# Patient Record
Sex: FEMALE | Race: WHITE | Employment: UNEMPLOYED | URBAN - METROPOLITAN AREA
[De-identification: names, ages, dates, MRNs, and addresses within clinical notes are randomized per-mention and may not be internally consistent; named-entity substitution may affect disease eponyms.]

---

## 2017-01-13 ENCOUNTER — OFFICE VISIT (OUTPATIENT)
Dept: ORTHOPEDIC SURGERY | Age: 57
End: 2017-01-13

## 2017-01-13 VITALS — BODY MASS INDEX: 22.63 KG/M2 | WEIGHT: 123 LBS | RESPIRATION RATE: 15 BRPM | HEIGHT: 62 IN

## 2017-01-13 DIAGNOSIS — S52.101A CLOSED FRACTURE OF PROXIMAL END OF RIGHT RADIUS, UNSPECIFIED FRACTURE MORPHOLOGY, INITIAL ENCOUNTER: Primary | ICD-10-CM

## 2017-01-13 PROCEDURE — 73110 X-RAY EXAM OF WRIST: CPT | Performed by: ORTHOPAEDIC SURGERY

## 2017-01-13 PROCEDURE — G8420 CALC BMI NORM PARAMETERS: HCPCS | Performed by: ORTHOPAEDIC SURGERY

## 2017-01-13 PROCEDURE — 99213 OFFICE O/P EST LOW 20 MIN: CPT | Performed by: ORTHOPAEDIC SURGERY

## 2017-01-13 PROCEDURE — 3014F SCREEN MAMMO DOC REV: CPT | Performed by: ORTHOPAEDIC SURGERY

## 2017-01-13 PROCEDURE — G8484 FLU IMMUNIZE NO ADMIN: HCPCS | Performed by: ORTHOPAEDIC SURGERY

## 2017-01-13 PROCEDURE — 3017F COLORECTAL CA SCREEN DOC REV: CPT | Performed by: ORTHOPAEDIC SURGERY

## 2017-01-13 PROCEDURE — 1036F TOBACCO NON-USER: CPT | Performed by: ORTHOPAEDIC SURGERY

## 2017-01-13 PROCEDURE — G8427 DOCREV CUR MEDS BY ELIG CLIN: HCPCS | Performed by: ORTHOPAEDIC SURGERY

## 2017-01-25 ENCOUNTER — TELEPHONE (OUTPATIENT)
Dept: ORTHOPEDIC SURGERY | Age: 57
End: 2017-01-25

## 2022-06-28 ENCOUNTER — OFFICE VISIT (OUTPATIENT)
Dept: UROGYNECOLOGY | Age: 62
End: 2022-06-28
Payer: MEDICARE

## 2022-06-28 DIAGNOSIS — N39.41 URGE INCONTINENCE: ICD-10-CM

## 2022-06-28 DIAGNOSIS — R10.2 PELVIC PAIN: Primary | ICD-10-CM

## 2022-06-28 DIAGNOSIS — R35.0 URINARY FREQUENCY: ICD-10-CM

## 2022-06-28 PROCEDURE — 99204 OFFICE O/P NEW MOD 45 MIN: CPT | Performed by: OBSTETRICS & GYNECOLOGY

## 2022-06-28 RX ORDER — HYDROCODONE BITARTRATE AND ACETAMINOPHEN 10; 325 MG/1; MG/1
1 TABLET ORAL EVERY 8 HOURS PRN
COMMUNITY

## 2022-06-28 RX ORDER — ESTRADIOL 0.1 MG/G
1 CREAM VAGINAL
COMMUNITY
Start: 2022-03-23

## 2022-06-28 RX ORDER — ESTRADIOL 0.5 MG/1
0.5 TABLET ORAL DAILY
COMMUNITY
Start: 2022-06-08

## 2022-06-28 RX ORDER — FLUOXETINE HYDROCHLORIDE 20 MG/1
20 CAPSULE ORAL
COMMUNITY

## 2022-06-28 RX ORDER — ALPRAZOLAM 2 MG/1
TABLET ORAL
COMMUNITY
Start: 2022-06-01

## 2022-06-28 ASSESSMENT — ENCOUNTER SYMPTOMS
BACK PAIN: 1
EYE ITCHING: 1
EYE PAIN: 1

## 2022-06-28 NOTE — PROGRESS NOTES
6/28/2022      HPI:     Name: Bharathi Smith  YOB: 1960    CC: Patient is a 62 y.o. female who is seen in consultation from Riley Rivera MD   for evaluation of stress incontinence , urge incontinence , pelvic pain and urinary frequency. HPI:      Op Note Irena Tipton MD - 03/08/2021 11:44 AM EST  Formatting of this note might be different from the original.  Patient: Bharathi Smith    Date of Procedure: 3/8/2021    CRN: [id number]    Preop dx: Anatomic stress urinary incontinence due to hypermobile urethra    Postop dx:   Same    Co-morbidities:  None    Operation:   Mid urethral sling using trans obturator approach with cystoscopy    Anesthesia: General    Surgeon: Dr. Sujit Daniel    Procedure: Urodynamic studies were performed in the standard fashion as demonstrated by the following procedure codes: [address], [address], [address] [place] [address], [facility].  Jd Otero RN    Uroflow:  Voided volume 34 ml   Residual volume 0 ml   Maximum flow (QMax) 6 ml/sec   Average flow rate (QAvg) 3 ml/sec   Comments:     Cystometrogram:  First Sensation 111 ml   First Desire 185 ml   Maximum Cystometric Capacity 233 ml   Total Infused Volume 252 ml   Uninhibited Detrusor Contraction [x] no  [] yes   Bladder pain [] yes [x] no   Comments:     Leak Point Pressures:  1st supine 102 ml at 202 cm/H2O negative leak with cough/valsalva   2nd supine 198 ml at 207 cm/H2O positive leak with moderate amount of leakage with cough     Micturition:  Max detrusor pressure at peak flow (Pdet at Qmax) 25 cm/H2O   Max abdominal pressure at peak flow (Pabd at Qmax) 39 cm/H2O   Max vesical pressure at peak flow (Pves at Qmax) 64 cm/H2O   Maximum/Peak flow rate (QMax) 20 ml/sec   Flow time 42 sec   Voided volume 254 ml   Residual 0 ml   EMG: [x] Coordinated [] non-coordinated [] appropriate [] artifactual   Comments:      Impression  Performed by [employer]      No acute process is identified in the abdomen and pelvis. Narrative  Performed by [employer]  HISTORY:   Perineal pain, post-menopausal BACK PAIN, FREQUENT URINATION   COMPARISON:  None   TECHNIQUE: Post IV contrast multiplanar CT images of the abdomen and pelvis   NOTE:  If there are questions about the content of this report, please contact [address] [place] [address] radiology by calling [phone]     FINDINGS:   LOWER CHEST:  Bilateral breast implants   LIVER:  Unremarkable   GALLBLADDER/BILE DUCTS:  Unremarkable.  No opaque gallstones   PANCREAS:  Unremarkable.  No mass or duct dilation   SPLEEN:  A peripherally calcified lesion measures up to 3.2 cm and is unchanged from a 5/18/2013 radiograph. Given stability, this requires no additional follow-up. ADRENALS:  Unremarkable     KIDNEYS/URETERS:  Unremarkable.  No hydronephrosis, stone, or suspicious mass   GI TRACT:  Unremarkable.  No obstruction, wall thickening, or pneumatosis. Normal appendix   VESSELS:  Mild atherosclerotic calcifications without aneurysm or dissection   LYMPH NODES: Unremarkable.  No enlarged lymph nodes   ABD WALL:  Unremarkable   PELVIS:  Uterus is surgically absent   BONES:  Unremarkable   OTHER:  None    Bladder control problem: Yes   How many months have you had a bladder problem? 4-5 years  Do you use pads to absorb lost urine? No.  How many trips do you make to the bathroom during the day? 20  How many times do you wake at night to go to the bathroom? 2+  Do you ever wet the bed while asleep? Yes  Are there times when you cannot make it to the bathroom on time? Yes  Does sound, sight, or feel of running water cause you to lose urine? Yes  How many ounces of liquid do you consume daily? 64  How many drinks containing caffeine do you consume daily?  1  Which best describes urine loss: (Check all that apply)   [] I lose urine during coughing, sneezing, running, lifting   [x] I lose urine with changes in posture, standing, walking   [] I lose urine continuously such that I am constantly wet   [x] I have sudden, urgent needs without the ability to make it to the bathroom  Have you seen a physician for complaints of urine loss? Yes If yes, who? Dr. Louise Tirado  Have you taken medication to prevent urine loss? Yes If yes, what meds? Myrbetriq    Bladder emptying problems:  Yes   How long have you had bladder emptying problems? 4 years  Do you notice any dribbling of urine when you stand after passing urine? Yes  Do you usually have difficulty starting your urine stream? No  Do you have to assume abnormal positions to urinate? Yes   Do you have to strain to empty your bladder? Yes  Do you feel as if your bladder is empty after passing urine? Yes  Is your urine flow: intermittent    Prolapse/Vaginal Support problems: Yes   Do you notice a bulge? Yes  How long have you had a protrusion or bulge? 1 years  Are your symptoms worse at the end of the day or after for prolonged periods? Yes  Do you push the protrusion back to help with a bowel movement or to empty your bladder? Yes  Have you ever used a pessary (plastic support device) for this problem? No    Bowel problem(s): No   Sexual History:  reports never being sexually active. Pelvic Pain:  Yes   Where is your pain? Pelvic area and Vagina  How long have you had pelvic pain? 4-5 years  Is your pain relieved by bladder emptying? No  Do you have pain with urination? No  Does anything relieve the pain?  No     Ob/Gyn History:    OB History    Para Term  AB Living   4 4 4     4   SAB IAB Ectopic Molar Multiple Live Births             4      # Outcome Date GA Lbr Luciano/2nd Weight Sex Delivery Anes PTL Lv   4 Term 89 42w0d  8 lb 5 oz (3.771 kg) F Vag-Spont   STEVE   3 Term 86 40w0d  7 lb 2 oz (3.232 kg) M Vag-Spont   STEVE   2 Term 06/10/80 40w0d  6 lb 2 oz (2.778 kg) F Vag-Spont   STEVE   1 Term  40w0d  7 lb 7 oz (3.374 kg) F Vag-Spont   STEVE     Past Medical History:   Past Medical History:   Diagnosis Date    Anxiety     Back pain     Degenerative disc disease at L5-S1 level     Depression     Panic attacks     Vertigo      Past Surgical History:   Past Surgical History:   Procedure Laterality Date    BLADDER SUSPENSION      2021    WRIST FRACTURE SURGERY  10/03/2016     Allergies: No Known Allergies  Current Medications:  Current Outpatient Medications   Medication Sig Dispense Refill    ALPRAZolam (XANAX) 2 MG tablet TAKE 1 TABLET BY MOUTH EVERY 12 TO 24 HOURS AS NEEDED      estradiol (ESTRACE) 0.1 MG/GM vaginal cream Place 1 g vaginally every 7 days      FLUoxetine (PROZAC) 20 MG capsule Take 20 mg by mouth      HYDROcodone-acetaminophen (NORCO)  MG per tablet Take 1 tablet by mouth every 8 hours as needed.  estradiol (ESTRACE) 0.5 MG tablet Take 0.5 mg by mouth daily      meclizine (ANTIVERT) 25 MG tablet Take 25 mg by mouth 3 times daily as needed (Patient not taking: Reported on 6/28/2022)      baclofen (LIORESAL) 10 MG tablet Take 10 mg by mouth 3 times daily (Patient not taking: Reported on 6/28/2022)      oxyCODONE (OXYCONTIN) 10 MG extended release tablet Take 10 mg by mouth every 12 hours (Patient not taking: Reported on 6/28/2022)      lubiprostone (AMITIZA) 8 MCG CAPS capsule Take 8 mcg by mouth daily (Patient not taking: Reported on 6/28/2022)      citalopram (CELEXA) 20 MG tablet Take 10 mg by mouth daily (Patient not taking: Reported on 6/28/2022)      gabapentin (NEURONTIN) 600 MG tablet Take 600 mg by mouth 3 times daily (Patient not taking: Reported on 6/28/2022)       No current facility-administered medications for this visit.      Social History:   Social History     Socioeconomic History    Marital status:      Spouse name: Not on file    Number of children: Not on file    Years of education: Not on file    Highest education level: Not on file   Occupational History    Not on file   Tobacco Use    Smoking status: Never Smoker    Smokeless tobacco: Never Used   Vaping Use    Vaping Use: Never used   Substance and Sexual Activity    Alcohol use: No    Drug use: No    Sexual activity: Never   Other Topics Concern    Not on file   Social History Narrative    Not on file     Social Determinants of Health     Financial Resource Strain:     Difficulty of Paying Living Expenses: Not on file   Food Insecurity:     Worried About Running Out of Food in the Last Year: Not on file    Onur of Food in the Last Year: Not on file   Transportation Needs:     Lack of Transportation (Medical): Not on file    Lack of Transportation (Non-Medical): Not on file   Physical Activity:     Days of Exercise per Week: Not on file    Minutes of Exercise per Session: Not on file   Stress:     Feeling of Stress : Not on file   Social Connections:     Frequency of Communication with Friends and Family: Not on file    Frequency of Social Gatherings with Friends and Family: Not on file    Attends Je[employer]'s witness Services: Not on file    Active Member of [address] [place] [address] or Organizations: Not on file    Attends Club or Organization Meetings: Not on file    Marital Status: Not on file   Intimate Partner Violence:     Fear of Current or Ex-Partner: Not on file    Emotionally Abused: Not on file    Physically Abused: Not on file    Sexually Abused: Not on file   Housing Stability:     Unable to Pay for Housing in the Last Year: Not on file    Number of [place] in the Last Year: Not on file    Unstable Housing in the Last Year: Not on file     Family History:   Family History   Problem Relation Age of Onset    Cancer Mother     Cancer Father      Review of Systems:  Review of Systems   Constitutional: Positive for unexpected weight change. HENT: Positive for hearing loss and postnasal drip. Eyes: Positive for pain, itching and visual disturbance. Endocrine: Positive for polydipsia. Genitourinary: Positive for enuresis, frequency, pelvic pain and vaginal pain.    Musculoskeletal: Positive for arthralgias, back pain, myalgias, neck pain and neck stiffness. Neurological: Positive for tremors, weakness and numbness. Psychiatric/Behavioral: Positive for agitation, behavioral problems, confusion, decreased concentration, sleep disturbance and suicidal ideas. The patient is nervous/anxious. All other systems reviewed and are negative. Objective:     Vital Signs  There were no vitals filed for this visit. Physical Exam  Physical Exam  HENT:      Head: Normocephalic and atraumatic. Eyes:      Conjunctiva/sclera: Conjunctivae normal.   Pulmonary:      Effort: Pulmonary effort is normal.   Abdominal:      Palpations: Abdomen is soft. Genitourinary:     Comments: Pelvic floor muscles are soft, the area where the sling is placed is soft and nontender. There is mild tenderness to the bladder. Musculoskeletal:      Cervical back: Normal range of motion and neck supple. Skin:     General: Skin is warm and dry. Neurological:      Mental Status: She is alert and oriented to person, place, and time. Assessment/Plan:     Roseanne Burks is a 62 y.o. female with   1. Pelvic pain    2. Urinary frequency    3. Urge incontinence    Old records reviewed, outside records reviewed    Shaneka Gonzalez presents today with a chief complaint of pelvic pain that she thinks started after having a transobturator tape placed. She did undergo urodynamics testing at the [facility] and subsequently had a transobturator tape placed. She does have a long history of anxiety and pelvic pain. She does have a pelvic pain doctor. On examination today I do not feel that the suburethral sling is contributing to any of her pain as her pelvic floor muscles are very soft and she is nontender under the urethra. I do think she may benefit from starting back on Myrbetriq as she was on this prior to the surgery. Some of her pain and discomfort may be coming from overactive bladder.   If this does not help I would suggest that she follow-up with her pain doctor for further evaluation. Patient was in the office for greater than 60 minutes face-to-face    No orders of the defined types were placed in this encounter. No orders of the defined types were placed in this encounter.       Tedra Apley, MD

## 2022-07-06 ENCOUNTER — TELEPHONE (OUTPATIENT)
Dept: UROGYNECOLOGY | Age: 62
End: 2022-07-06

## 2022-07-06 NOTE — TELEPHONE ENCOUNTER
Spoke with patient who is requesting we send in script for Myrbetriq 25mg, Dr. Kamran Hernández suggested she start back up on this medication to help with her over active bladder. Patient supposed to get script through GYN, but GYN just retired last week so unable to fill script. Per Hermelindo Or, NP we will send Myrbetriq 25mg for 30 days with 1 refill to give her enough time to reestablish care with new GYN. Patient thankful and had no further questions at this time.